# Patient Record
Sex: MALE | Race: WHITE | URBAN - METROPOLITAN AREA
[De-identification: names, ages, dates, MRNs, and addresses within clinical notes are randomized per-mention and may not be internally consistent; named-entity substitution may affect disease eponyms.]

---

## 2017-01-19 ENCOUNTER — OFFICE VISIT (OUTPATIENT)
Dept: SLEEP MEDICINE | Facility: CLINIC | Age: 37
End: 2017-01-19
Payer: COMMERCIAL

## 2017-01-19 VITALS
WEIGHT: 252 LBS | DIASTOLIC BLOOD PRESSURE: 80 MMHG | SYSTOLIC BLOOD PRESSURE: 129 MMHG | HEART RATE: 79 BPM | HEIGHT: 72 IN | BODY MASS INDEX: 34.13 KG/M2

## 2017-01-19 DIAGNOSIS — E66.9 OBESITY (BMI 30-39.9): ICD-10-CM

## 2017-01-19 DIAGNOSIS — G47.9 SLEEP DISTURBANCE: Primary | ICD-10-CM

## 2017-01-19 PROCEDURE — 99204 OFFICE O/P NEW MOD 45 MIN: CPT | Performed by: INTERNAL MEDICINE

## 2017-01-19 NOTE — PROGRESS NOTES
[facility]  Outpatient Sleep Medicine Consultation  January 19, 2017      Name: Robin Salcido MRN# [id number]   Age: 36 year old YOB: 1980     Date of Consultation: January 19, 2017  Consultation is requested by: Alesia Shane MD  Primary care provider: [facility] [place]  [place] [facility]: [facility] [place]         Reason for Sleep Consult:     Robin Salcido is a 36 year old male nightly witnessed apnea, snoring, gasping, poor quality of sleep and frequent awakening, excessive daytime sleepiness and tiredness.         Assessment and Plan:     Summary Sleep Diagnoses/Recommendations:    1. Sleep Disturbance/Hypersomnia:  High suspicion of sleep disordered breathing based on patient's symptoms (snoring, excessive daytime sleepiness, witnessed apneas), high BMI, neck circumference and oropharyngeal examination). Will schedule Home sleep study. We also discussed the pathophysiology of sleep disordered breathing and the importance of treating it if S/he should have it. Follow up after sleep study to discuss the result of sleep study and treatment options. He has maintenance insomnia related to undiagnosed sleep disordered breathing.    2. Obesity:  Counseled regarding weight loss through diet modification and increased physical activity. Patient was given instuctions of weight loss and advised to follow up her PCP for further weight loss interventions.      Orders Placed This Encounter   Procedures     HST-HOME SLEEP TEST/TYPE 3 GIL       Summary Counseling:  See instructions    Counseling included a comprehensive review of diagnostic and therapeutic strategies as well as risks of inadequate therapy.  Educational materials provided in instructions.    All questions were answered.  The patient indicates understanding of the above issues and agrees with the plan set forth.           History of Present Illness:     Robin Salcido is a 36 year old male with history of ADHD,  depression, asthma, psoriasis and allergic rhinitis who presents to the [facility] in [place] with complains of sleep disturbance and evaluation of sleep apnea. He was referred by Dr. Alesia Shane. Patient has loud snoring, wakes up gasping air and stops breathing as per family. Frequent awakening and unable to stay asleep at night. He feels tired and sleepy during the day. He has occasional sleep paralysis, heartburn and morning headache. He grinds his teeth. Not using mouth guard. He has depression and anxiety controlled with medications.  He has hx of ADHD and takes Methylphenidate long acting 40 mg daily.     Please see below for sleep ROS details.    PREVIOUS IN- LAB or HOME SLEEP STUDIES:   None     SLEEP-WAKE SCHEDULE:     Robin Salcido     -Describes themself as a night person;      -ON WEEKDAYS, goes to sleep at 10:30 PM during the week; awakens  7:00 AM with an alarm; falls asleep in 15-30 minutes; denies difficulty falling asleep.     -ON WEEKENDS, goes to sleep at 11:00 PM and wakes up at 8:00 AM without an alarm; falls asleep in 15 minutes.       -Awakens 3-4 times a night for 30 minutes before falling back to sleep; awakens to go to the bathroom and rollover and choking.      -Total sleep time: 7 hours per night.    -Naps 0 times per week.       BEDTIME ACTIVITIES AND SHIFT WORK:    Robin Salcido    -does watch TV in bed and does not use electronics in bed and read in bed.     -does not do shift work.  He works day shifts.       SCALES       SLEEP APNEA: Stopbang score: 6       INSOMNIA:  Insomnia severity score: 16       SLEEPINESS: [place] sleepiness scale (ESS):  11   Drowsy driving/near accidents: No          PHQ9: 13    SLEEP COMPLAINTS:   Snoring- 7 days/week  Witness apnea: Yes  Gasping/Choking: Yes  Excessive daytime sleep: Yes  Toss/turn: Yes  Excessive tiredness/fatigue:  Yes  Morning headaches: No/Yes  Dry mouth/throat: Yes  Dyspnea: No  Coexisting Lung disease:  No    Coexisting Heart disease: No    Does patient have a bed partner: Yes  Has bed partner been sleeping separately because of snoring:  No            RLS Screen: When you try to relax in the evening or sleep at  night, do you ever have unpleasant, restless feelings in your  legs that can be relieved by walking or movement? Yes    Periodic limb movement: Yes    Narcolepsy:       denies sudden urges of sleep attacks     denies cataplexy     occasional sleep paralysis      denies hallucinations     Sleep Behaviors:     denies leg symptoms/movements     denies motor restlessness     denies night terrors     Yes, bruxism     denies automatic behaviors    Other subjective complaints:     denies anxiety or rumination      denies pain and discomfort at  night     denies waking up with heart pounding or racing     Yes, GERD/heartburn         Parasomnia:   NREM - denies recurrent persistent confusional arousal, night eating, sleep walking or sleep terrors   REM  - denies dream enactment; injuries     Safety: None             Medications:     Current Outpatient Prescriptions   Medication Sig     METHYLPHENIDATE HCL PO Take 40 mg by mouth CR     Sertraline HCl (ZOLOFT PO) Take by mouth daily     BuPROPion HCl (WELLBUTRIN PO) Take 300 mg by mouth     Cetirizine HCl (ZYRTEC PO)      VITAMIN D, CHOLECALCIFEROL, PO Take by mouth daily     Naproxen Sodium (ALEVE PO)      HYDROcodone-acetaminophen (NORCO) 5-325 MG per tablet Take 1-2 tablets by mouth every 4 hours as needed for pain     tamsulosin (FLOMAX) 0.4 MG 24 hr capsule Take 1 capsule (0.4 mg) by mouth daily     No current facility-administered medications for this visit.        Medication that can affect sleep: Methylphenidate CD 40 mg daily, Zoloft 100 mg daily and Wellbutrin  mg daily     Allergies   Allergen Reactions     Percocet [Oxycodone-Acetaminophen] Anxiety            Past Medical History:     Does not need 02 supplement at night     Past Medical History    Diagnosis Date     Depressive disorder      ADHD (attention deficit hyperactivity disorder)      Anxiety                Past Surgical History:    Yes previous upper airway surgery: T&A      Past Surgical History   Procedure Laterality Date     Tonsillectomy & adenoidectomy              Social History:     Social History   Substance Use Topics     Smoking status: Current Every Day Smoker     Types: Dip, chew, snus or snuff     Smokeless tobacco: Not on file     Alcohol Use: Yes         Chemical History:     Tobacco: Yes, chew tobacco     Uses 3-4 sodas/day. Last caffeine intake is usually before 2 pm    Supplements for wakefulness: No    EtOH: Yes   Recreational Drugs: No    Psych Hx:    ADHD diagnosed 4/2004, depression and anxiety    Current dangers to self or others: None           Family History:     No family history on file.     Sleep Family Hx:        RLS- No  MAGALIS - No  Insomnia - No  Parasomnia - No         Review of Systems:     A complete 10 point review of systems was negative other than HPI or as commented below:   Patient denies chest pain, wheezing, abdominal pain, n&v, fever, chills, dysuria, leg pain or swelling. Patient has sore throat, postnasal drip, running nose, dry cough and dyspnea with activity.       Robin Salcido has gained 0-5 pounds in the last year.            Physical Examination:   /82 mmHg  Pulse 79  Ht 1.829 m (6')  Wt 114.306 kg (252 lb)  BMI 34.17 kg/m2     Neck Circumference: 50 cm   Constitutional: . Awake, alert, cooperative, in no apparent distress  Mood: euthymic; affect congruent with full range and intensity.  Attention/Concentration:  Normal   Eyes: Pupils round and reactive. No icterus.  ENT: Mallampati Class: IV.   Tonsillar Stage: 0  surgically removed  Clear nasal passages. Enlarged inferior turbinates. No deviated septum.  Oropharynx: No high arched palate. No pharyngeal erythema or exudates, elongated uvula. No lateral narrowing  Tongue: No macroglossia    Dentition: Good.  Dentures: None  Neck: Supple, no thyroid enlargement.   Cardiovascular: Regular S1 and S2, no gallops or murmurs.   Pulmonary:  Chest symmetric, lungs clear bilaterally and no crackles, wheezes or rales.  Abdomen: Soft, obese, non tender.  Extremities:  No pedal edema.  Muscle/joint: Strength and tone normal   Skin:  No rash or significant lesions.   Neurologic: Alert, oriented x3, no focal neurological deficit.           Data: All pertinent previous laboratory data reviewed     No results found for: PH, PHARTERIAL, PO2, [id number], SAT, PCO2, HCO3, BASEEXCESS, KIMBERLY, BEB  No results found for: TSH  Lab Results   Component Value Date    * 08/23/2015     Lab Results   Component Value Date    HGB 14.9 08/23/2015     Lab Results   Component Value Date    BUN 17 08/23/2015    CR 1.16 08/23/2015     Lab Results   Component Value Date    CO2 22 08/23/2015     No results found for: ESTHER      Echocardiography: No    Chest x-ray: No    PFT: No        Copy to: Alesia Shane MD- [facility], [place] Abdullahi I. Ahmed, MD 1/19/2017   [facility]  [address] [place], [place] [address] [phone] Clinic    Total time spent with patient: 45 minutes with this patient today in which 25 minutes was spent in counseling/coordination of care and going over planned testing and recommendations.

## 2017-01-19 NOTE — MR AVS SNAPSHOT
"              After Visit Summary   1/19/2017    Robin Salcido    MRN: [id number]           Patient Information     Date Of Birth          1980        Visit Information        Provider Department      1/19/2017 5:00 PM Larry Arrington MD [facility] [place]        Today's Diagnoses     Sleep disturbance    -  1     Obesity (BMI 30-39.9)           Care Instructions    MY TREATMENT INFORMATION FOR SLEEP DISTURBANCE-  Robin Salcido    DOCTOR : Larry Arrington  SLEEP CENTER :  [place]  MY CONTACT NUMBER:[phone]        If I haven't had a sleep study yet, what can I expect?  A personal story from [facility]  https://www.[employer].com/watch?v=AxPLmlRpnCs    Am I having a home sleep study?  Here is a video in case you get home and want to make sure you have done it correctly  https://www.[employer].com/watch?v=IXN8O3vBzc7&feature=youtu.be    Suspected sleep apnea: Sleep study ordered.    Follow up in sleep clinic 1-2 weeks after sleep study to discuss results of sleep study and treatment options.    Frequently asked questions:  1. What is Obstructive Sleep Apnea (MAGALIS)? MAGALIS is the most common type of sleep apnea. Apnea literally means, \"without breath.\" It is characterized by repetitive pauses in breathing, despite continued effort to breathe, and is usually associated with a reduction in blood oxygen saturation. Apneas can last 10 to over 60 seconds. It is caused by narrowing or collapse of the upper airway as muscles relax during sleep. Severity of sleep apnea is determined by frequency of breathing events and their effect on your sleep and oxygen levels determined during sleep testing.   2. What are the consequences of MAGALIS? Symptoms include: daytime sleepiness- possibly increasing the risk of falling asleep while driving, unrefreshing/restless sleep, snoring, insomnia, waking frequently to urinate, waking with heartburn or reflux, reduced concentration and memory, and morning headaches. " Other health consequences may include development of high blood pressure and other cardiovascular disease in persons who are susceptible. Untreated MAGALIS  can contribute to heart disease, stroke and diabetes.   3. What are the treatment options? In most situations, sleep apnea is a lifelong disease that must be managed with daily therapy. Medications are not effective for sleep apnea and surgery is generally not performed until other therapies have been tried. Therapy is usually tailored to the individual patient based on many factors including your wishes as well as severity of sleep apnea and severity of obesity. Continuous Positive Airway (CPAP) is the most reliable treatment. An oral device to hold your jaw forward is usually the next most reliable option. Other options include postioning devices (to keep you off your back), weight loss, and surgery including a tongue pacing device. There is more detail about some of these options below.            1. CPAP-  WHAT DOES IT DO AND HOW CAN I LEARN TO WEAR IT?                               BEFORE I START, CAN I WATCH A MOVIE TO GET A PLAN ON HOW TO USE CPAP?  https://www.Meetup.com/watch?y=i5B28rx755W      Continuous positive airway pressure, or CPAP, is the most effective treatment for obstructive sleep apnea. It works by blowing room air, through a mask, to hold your throat open. A decision to use CPAP is a major step forward in the pursuit of a healthier life. The successful use of CPAP will help you breathe easier, sleep better and live healthier. You can choose CPAP equipment from any durable medical equipment provider that meets your needs.  Using CPAP can be a positive experience if you keep these lees points in mind:  1. Commitment  CPAP is not a quick fix for your problem. It involves a long-term commitment to improve your sleep and your health.    2. Communication  Stay in close communication with both your sleep doctor and your CPAP supplier. Ask lots of  "questions and seek help when you need it.    3. Consistency  Use CPAP all night, every night and for every nap. You will receive the maximum health benefits from CPAP when you use it every time that you sleep. This will also make it easier for your body to adjust to the treatment.    4. Correction  The first machine and mask that you try may not be the best ones for you. Work with your sleep doctor and your CPAP supplier to make corrections to your equipment selection. Ask about trying a different type of machine or mask if you have ongoing problems. Make sure that your mask is a good fit and learn to use your equipment properly.    5. Challenge  Tell a family member or close friend to ask you each morning if you used your CPAP the previous night. Have someone to challenge you to give it your best effort.    6. Connection   Your adjustment to CPAP will be easier if you are able to connect with others who use the same treatment. Ask your sleep doctor if there is a support group in your area for people who have sleep apnea, or look for one on the Internet.  7. Comfort   Increase your level of comfort by using a saline spray, decongestant or heated humidifier if CPAP irritates your nose, mouth or throat. Use your unit's \"ramp\" setting to slowly get used to the air pressure level. There may be soft pads you can buy that will fit over your mask straps. Look on www.CPAP.com for accessories that can help make CPAP use more comfortable.  8. Cleaning   Clean your mask, tubing and headgear on a regular basis. Put this time in your schedule so that you don't forget to do it. Check and replace the filters for your CPAP unit and humidifier.    9. Completion   Although you are never finished with CPAP therapy, you should reward yourself by celebrating the completion of your first month of treatment. Expect this first month to be your hardest period of adjustment. It will involve some trial and error as you find the machine, mask " and pressure settings that are right for you.    10. Continuation  After your first month of treatment, continue to make a daily commitment to use your CPAP all night, every night and for every nap.    CPAP-Tips to starting with success:  Begin using your CPAP for short periods of time during the day while you watch TV or read.    Use CPAP every night and for every nap. Using it less often reduces the health benefits and makes it harder for your body to get used to it.    Make small adjustments to your mask, tubing, straps and headgear until you get the right fit. Tightening the mask may actually worsen the leak.  If it leaves significant marks on your face or irritates the bridge of your nose, it may not be the best mask for you.  Speak with the person who supplied the mask and consider trying other masks. Insurances will allow you to try different masks during the first month of starting CPAP.  Insurance also covers a new mask, hose and filter about every 6 months.    Use a saline nasal spray to ease mild nasal congestion. Neti-Pot or saline nasal rinses may also help. Nasal gel sprays can help reduce nasal dryness.  Biotene mouthwash can be helpful to protect your teeth if you experience frequent dry mouth.  Dry mouth may be a sign of air escaping out of your mouth or out of the mask in the case of a full face mask.  Speak with your provider if you expect that is the case.     Take a nasal decongestant to relieve more severe nasal or sinus congestion.  Do not use Afrin (oxymetazoline) nasal spray more than 3 days in a row.  Speak with your sleep doctor if your nasal congestion is chronic.    Use a heated humidifier that fits your CPAP model to enhance your breathing comfort. Adjust the heat setting up if you get a dry nose or throat, down if you get condensation in the hose or mask.  Position the CPAP lower than you so that any condensation in the hose drains back into the machine rather than towards the  mask.    Try a system that uses nasal pillows if traditional masks give you problems.    Clean your mask, tubing and headgear once a week. Make sure the equipment dries fully.    Regularly check and replace the filters for your CPAP unit and humidifier.    Work closely with your sleep provider and your CPAP supplier to make sure that you have the machine, mask and air pressure setting that works best for you. It is better to stop using it and call your provider to solve problems than to lay awake all night frustrated with the device.    BESIDES CPAP, WHAT OTHER THERAPIES ARE THERE?      Positioning Device  Positioning devices are generally used when sleep apnea is mild and only occurs on your back.This example shows a pillow that straps around the waist. It may be appropriate for those whose sleep study shows milder sleep apnea that occurs primarily when lying flat on one's back. Preliminary studies have shown benefit but effectiveness at home may need to be verified by a home sleep test. These devices are generally not covered by medical insurance.                      Oral Appliance  What is oral appliance therapy?  An oral appliance is a small acrylic device that fits over the upper and lower teeth or tongue (similar to an orthodontic retainer or a mouth guard). This device slightly advances the lower jaw or tongue, which moves the base of the tongue forward, opens the airway, improves breathing and can effectively treat snoring and obstructive sleep apnea sleep apnea. The appliance is fabricated and customized by a qualified dentist with experience in treating snoring and sleep apnea. Oral appliances are usually well tolerated and have relatively high compliance by patients1, 2, 3.  When is an oral appliance indicated?  Oral appliance therapy is recommended as a first-line treatment for patients with primary snoring, mild sleep apnea, and for patients with moderate sleep apnea who prefer appliance therapy to use  of CPAP4, 5. Severity of sleep apnea is determined by sleep testing and is based on the number of respiratory events per hour of sleep.   How successful is oral appliance therapy?  The success rate of oral appliance therapy in patients with mild sleep apnea is 75-80% while in patients with moderate sleep apnea it is 50-70%. The chance of success in patients with severe sleep apnea is 40-50%. The research also shows that oral appliances have a beneficial effect on the cardiovascular health of MAGALIS patients at the same magnitude as CPAP therapy7.  Oral appliances should be a second-line treatment in cases of severe sleep apnea, but if not completely successful then a combination therapy utilizing CPAP plus oral appliance therapy may be effective. Oral appliances tend to be effective in a broad range of patients although studies show that the patients who have the highest success are females, younger patients, those with milder disease, and less severe obesity. 3, 6.   The chances of success are lower in patients who have more severe MAGALIS, are older, and those who are morbidly obese.     Example of an oral appliance   Finding a dentist that practices dental sleep medicine  Specific training is available through the American Academy of Dental Sleep Medicine for dentists interested in working in the field of sleep. To find a dentist who is educated in the field of sleep and the use of oral appliances, near you, visit the Web site of the American Academy of Dental Sleep Medicine; also see   http://www.accpstorage.org/newOrganization/patients/oralAppliances.pdf  To search for a dentist certified in these practices:  Http://aadsm.org/FindADentist.aspx?1  1. Megha et al. Objectively measured vs self-reported compliance during oral appliance therapy for sleep-disordered breathing. Chest 2013; 144(5): 2547-9866.  2. Rupesh et al. Objective measurement of compliance during oral appliance therapy for sleep-disordered  breathing. Thorax 2013; 68(1): 91-96.  3. Dutch et al. Mandibular advancement devices in 620 men and women with MAGALIS and snoring: tolerability and predictors of treatment success. Chest 2004; 125: 0808-8568.  4. Feliberto et al. Oral appliances for snoring and MAGALIS: a review. Sleep 2006; 29: 244-262.  5. Jovany et al. Oral appliance treatment for MAGALIS: an update. J Clin Sleep Med 2014; 10(2): 215-227.  6. Yvette et al. Predictors of OSAH treatment outcome. J Dent Res 2007; 86: 5799-6313.      Weight Loss:    Weight management is a personal decision.  If you are interested in exploring weight loss strategies, the following discussion covers the impact on weight loss on sleep apnea and the approaches that may be successful.    Weight loss decreases severity of sleep apnea in most people with obesity. For those with mild obesity who have developed snoring with weight gain, even 15-30 pound weight loss can improve and occasionally eliminate sleep apnea.  Structured and life-long dietary and health habits are necessary to lose weight and keep healthier weight levels.     Though there may be significant health benefits from weight loss, long-term weight loss is very difficult to achieve- studies show success with dietary management in less than 10% of people. In addition, substantial weight loss may require years of dietary control and may be difficult if patients have severe obesity. In these cases, surgical management may be considered.  Finally, older individuals who have tolerated obesity without health complications may be less likely to benefit from weight loss strategies.    Your BMI is Body mass index is 34.17 kg/(m^2).  Body mass index (BMI) is one way to tell whether you are at a healthy weight, overweight, or obese. It measures your weight in relation to your height.  A BMI of 18.5 to 24.9 is in the healthy range. A person with a BMI of 25 to 29.9 is considered overweight, and someone with a BMI of 30  or greater is considered obese. More than two-thirds of American adults are considered overweight or obese.  Being overweight or obese increases the risk for further weight gain. Excess weight may lead to heart disease and diabetes.  Creating and following plans for healthy eating and physical activity may help you improve your health.  Weight control is part of healthy lifestyle and includes exercise, emotional health, and healthy eating habits. Careful eating habits lifelong are the mainstay of weight control. Though there are significant health benefits from weight loss, long-term weight loss with diet alone may be very difficult to achieve- studies show long-term success with dietary management in less than 10% of people. Attaining a healthy weight may be especially difficult to achieve in those with severe obesity. In some cases, medications, devices and surgical management might be considered.  What can you do?  If you are overweight or obese and are interested in methods for weight loss, you should discuss this with your provider.     Consider reducing daily calorie intake by 500 calories.     Keep a food journal.     Avoiding skipping meals, consider cutting portions instead.    Diet combined with exercise helps maintain muscle while optimizing fat loss. Strength training is particularly important for building and maintaining muscle mass. Exercise helps reduce stress, increase energy, and improves fitness. Increasing exercise without diet control, however, may not burn enough calories to loose weight.       Start walking three days a week 10-20 minutes at a time    Work towards walking thirty minutes five days a week     Eventually, increase the speed of your walking for 1-2 minutes at time    In addition, we recommend that you review healthy lifestyles and methods for weight loss available through the National Institutes of Health patient information  sites:  http://win.niddk.nih.gov/publications/index.htm    And look into health and wellness programs that may be available through your health insurance provider, employer, local community center, or skinny club.    Weight management plan: Patient was referred to their PCP to discuss a diet and exercise plan.    Surgery:    Upper Airway Surgery for MAGALIS  Surgery for MAGALIS is a second-line treatment option in the management of sleep apnea.  Surgery should be considered for patients who are having a difficult time tolerating CPAP.    Surgery for MAGALIS is directed at areas that are responsible for narrowing or complete obstruction of the airway during sleep.  There are a wide range of procedures available to enlarge and/or stabilize the airway to prevent blockage of breathing in the three major areas where it can occur: the palate, tongue, and nasal regions.  Successful surgical treatment depends on the accurate identification of the factors responsible for obstructive sleep apnea in each person.  A personalized approach is required because there is no single treatment that works well for everyone.  Because of anatomic variation, consultation with an examination by a sleep surgeon is a critical first step in determining what surgical options are best for each patient.  In some cases, examination during sedation may be recommended in order to guide the selection of procedures.  Patients will be counseled about risks and benefits as well as the typical recovery course after surgery. Surgery is typically not a cure for a person s MAGALIS.  However, surgery will often significantly improve one s MAGALIS severity (termed  success rate ).  Even in the absence of a cure, surgery will decrease the cardiovascular risk associated with OSA7; improve overall quality of life8 (sleepiness, functionality, sleep quality, etc).          Palate Procedures:  Patients with MAGALIS often have narrowing of their airway in the region of their tonsils and  uvula.  The goals of palate procedures are to widen the airway in this region as well as to help the tissues resist collapse.  Modern palate procedure techniques focus on tissue conservation and soft tissue rearrangement, rather than tissue removal.  Often the uvula is preserved in this procedure. Residual sleep apnea is common in patient after pharyngoplasty with an average reduction in sleep apnea events of 33%2.      Tongue Procedures:  While patients are awake, the muscles that surround the throat are active and keep this region open for breathing. These muscles relax during sleep, allowing the tongue and other structures to collapse and block breathing.  There are several different tongue procedures available.  Selection of a tongue base procedure depends on characteristics seen on physical exam.  Generally, procedures are aimed at removing bulky tissues in this area or preventing the back of the tongue from falling back during sleep.  Success rates for tongue surgery range from 50-62%3.    Hypoglossal Nerve Stimulation:  Hypoglossal nerve stimulation has recently received approval from the [place] Food and Drug Administration for the treatment of obstructive sleep apnea.  This is based on research showing that the system was safe and effective in treating sleep apnea6.  Results showed that the median AHI score decreased 68%, from 29.3 to 9.0. This therapy uses an implant system that senses breathing patterns and delivers mild stimulation to airway muscles, which keeps the airway open during sleep.  The system consists of three fully implanted components: a small generator (similar in size to a pacemaker), a breathing sensor, and a stimulation lead.  Using a small handheld remote, a patient turns the therapy on before bed and off upon awakening.    Candidates for this device must be greater than 22 years of age, have moderate to severe MAGALIS (AHI between 20-65), BMI less than 32, have tried CPAP/oral  appliance without success, and have appropriate upper airway anatomy (determined by a sleep endoscopy performed by Dr. Rivera).    Hypoglossal Nerve Stimulation Pathway:    The sleep surgeon s office will work with the patient through the insurance prior-authorization process (including communications and appeals).    Nasal Procedures:  Nasal obstruction can interfere with nasal breathing during the day and night.  Studies have shown that relief of nasal obstruction can improve the ability of some patients to tolerate positive airway pressure therapy for obstructive sleep apnea1.  Treatment options include medications such as nasal saline, topical corticosteroid and antihistamine sprays, and oral medications such as antihistamines or decongestants. Non-surgical treatments can include external nasal dilators for selected patients. If these are not successful by themselves, surgery can improve the nasal airway either alone or in combination with these other options.      Combination Procedures:  Combination of surgical procedures and other treatments may be recommended, particularly if patients have more than one area of narrowing or persistent positional disease.  The success rate of combination surgery ranges from 66-80%2,3.      1. Keeley CHEN. The Role of the Nose in Snoring and Obstructive Sleep Apnoea: An Update.  Eur Arch Otorhinolaryngol. 2011; 268: 1365-73.  2.  Capjesus SM; Ortega JA; Lon JR; Pallanch JF; Zenaida MB; Alyx SG; Rohit LOPEZ. Surgical modifications of the upper airway for obstructive sleep apnea in adults: a systematic review and meta-analysis. SLEEP 2010;33(10):3303-3912. Kacy SOUSA. Hypopharyngeal surgery in obstructive sleep apnea: an evidence-based medicine review.  Arch Otolaryngol Head Neck Surg. 2006 Feb;132(2):206-13.  3. Hector IVERSONH1, Maye Y, Stevie CLARKE. The efficacy of anatomically based multilevel surgery for obstructive sleep apnea. Otolaryngol Head Neck Surg. 2003  Oct;129(4):327-35.  4. Kacy SOUSA, Goldberg A. Hypopharyngeal Surgery in Obstructive Sleep Apnea: An Evidence-Based Medicine Review. Arch Otolaryngol Head Neck Surg. 2006 Feb;132(2):206-13.  5. Andrea THOMPSON et al. Upper-Airway Stimulation for Obstructive Sleep Apnea.  N Engl J Med. 2014 Jan 9;370(2):139-49.  6. Corazon Y et al. Increased Incidence of Cardiovascular Disease in Middle-aged Men with Obstructive Sleep Apnea. Am J Respir Crit Care Med; 2002 166: 159-165  7. Medina EM et al. Studying Life Effects and Effectiveness of Palatopharyngoplasty (SLEEP) study: Subjective Outcomes of Isolated Uvulopalatopharyngoplasty. Otolaryngol Head Neck Surg. 2011; 144: 623-631.                Follow-ups after your visit        Future tests that were ordered for you today     Open Future Orders        Priority Expected Expires Ordered    HST-HOME SLEEP TEST/TYPE 3 GIL Routine  7/21/2017 1/19/2017            Who to contact     If you have questions or need follow up information about today's clinic visit or your schedule please contact [facility] directly at [phone].  Normal or non-critical lab and imaging results will be communicated to you by OpenSpirithart, letter or phone within 4 business days after the clinic has received the results. If you do not hear from us within 7 days, please contact the clinic through OpenSpirithart or phone. If you have a critical or abnormal lab result, we will notify you by phone as soon as possible.  Submit refill requests through [email or web address] or call your pharmacy and they will forward the refill request to us. Please allow 3 business days for your refill to be completed.          Additional Information About Your Visit        [email or web address] Information     [email or web address] gives you secure access to your electronic health record. If you see a primary care provider, you can also send messages to your care team and make appointments. If you have questions, please call your primary care clinic.  If  you do not have a primary care provider, please call [phone] and they will assist you.        Care EveryWhere ID     This is your Care EveryWhere ID. This could be used by other organizations to access your [facility] medical records  [id number]        Your Vitals Were     Pulse Height BMI (Body Mass Index)             79 1.829 m (6') 34.17 kg/m2          Blood Pressure from Last 3 Encounters:   01/19/17 140/82   08/23/15 143/76    Weight from Last 3 Encounters:   01/19/17 114.306 kg (252 lb)   08/23/15 117.935 kg (260 lb)               Primary Care Provider Fax #    [facility] [phone]       No address on file        Thank you!     Thank you for choosing [facility]  for your care. Our goal is always to provide you with excellent care. Hearing back from our patients is one way we can continue to improve our services. Please take a few minutes to complete the written survey that you may receive in the mail after your visit with us. Thank you!             Your Updated Medication List - Protect others around you: Learn how to safely use, store and throw away your medicines at www.disposemymeds.org.          This list is accurate as of: 1/19/17  5:51 PM.  Always use your most recent med list.                   Brand Name Dispense Instructions for use    ALEVE PO          HYDROcodone-acetaminophen 5-325 MG per tablet    NORCO    20 tablet    Take 1-2 tablets by mouth every 4 hours as needed for pain       METHYLPHENIDATE HCL PO      Take 40 mg by mouth CR       tamsulosin 0.4 MG capsule    FLOMAX    10 capsule    Take 1 capsule (0.4 mg) by mouth daily       VITAMIN D (CHOLECALCIFEROL) PO      Take by mouth daily       * WELLBUTRIN PO      Take 300 mg by mouth       * BUPROPION HCL PO      Take 150 mg by mouth daily       ZOLOFT PO      Take 150 mg by mouth daily       ZYRTEC PO          * Notice:  This list has 2 medication(s) that are the same as other medications prescribed  for you. Read the directions carefully, and ask your doctor or other care provider to review them with you.

## 2017-01-19 NOTE — NURSING NOTE
Chief Complaint   Patient presents with     Consult     Snores per family, Can hear thru closed door.  Gasps, chokes, holds breath. Can get to sleep but can't stay asleep. referred by Dr Shane.       Initial /82 mmHg  Pulse 79  Ht 1.829 m (6')  Wt 114.306 kg (252 lb)  BMI 34.17 kg/m2 Estimated body mass index is 34.17 kg/(m^2) as calculated from the following:    Height as of this encounter: 1.829 m (6').    Weight as of this encounter: 114.306 kg (252 lb).  BP completed using cuff size: regular      Neck 50  19 3/4  Ess 11  Insominia  16  PHQ - 9  13    Nadiya Aguirre LPN

## 2017-01-19 NOTE — PATIENT INSTRUCTIONS
"MY TREATMENT INFORMATION FOR SLEEP DISTURBANCE-  Robin Salcido    DOCTOR : Larry REYNA [facility]  SLEEP CENTER :  [place]  MY CONTACT NUMBER:[phone]        If I haven't had a sleep study yet, what can I expect?  A personal story from Jimmy  https://www.Lovely.com/watch?v=AxPLmlRpnCs    Am I having a home sleep study?  Here is a video in case you get home and want to make sure you have done it correctly  https://www.IPNetVoiceube.com/watch?v=PVX2K3yKxp7&feature=youtu.be    Suspected sleep apnea: Sleep study ordered.    Follow up in sleep clinic 1-2 weeks after sleep study to discuss results of sleep study and treatment options.    Frequently asked questions:  1. What is Obstructive Sleep Apnea (MAGALIS)? MAGALIS is the most common type of sleep apnea. Apnea literally means, \"without breath.\" It is characterized by repetitive pauses in breathing, despite continued effort to breathe, and is usually associated with a reduction in blood oxygen saturation. Apneas can last 10 to over 60 seconds. It is caused by narrowing or collapse of the upper airway as muscles relax during sleep. Severity of sleep apnea is determined by frequency of breathing events and their effect on your sleep and oxygen levels determined during sleep testing.   2. What are the consequences of MAGALIS? Symptoms include: daytime sleepiness- possibly increasing the risk of falling asleep while driving, unrefreshing/restless sleep, snoring, insomnia, waking frequently to urinate, waking with heartburn or reflux, reduced concentration and memory, and morning headaches. Other health consequences may include development of high blood pressure and other cardiovascular disease in persons who are susceptible. Untreated MAGALIS  can contribute to heart disease, stroke and diabetes.   3. What are the treatment options? In most situations, sleep apnea is a lifelong disease that must be managed with daily therapy. Medications are not effective for sleep apnea and surgery is " generally not performed until other therapies have been tried. Therapy is usually tailored to the individual patient based on many factors including your wishes as well as severity of sleep apnea and severity of obesity. Continuous Positive Airway (CPAP) is the most reliable treatment. An oral device to hold your jaw forward is usually the next most reliable option. Other options include postioning devices (to keep you off your back), weight loss, and surgery including a tongue pacing device. There is more detail about some of these options below.            1. CPAP-  WHAT DOES IT DO AND HOW CAN I LEARN TO WEAR IT?                               BEFORE I START, CAN I WATCH A MOVIE TO GET A PLAN ON HOW TO USE CPAP?  https://www.Ablynx.com/watch?v=h7I75xr409P      Continuous positive airway pressure, or CPAP, is the most effective treatment for obstructive sleep apnea. It works by blowing room air, through a mask, to hold your throat open. A decision to use CPAP is a major step forward in the pursuit of a healthier life. The successful use of CPAP will help you breathe easier, sleep better and live healthier. You can choose CPAP equipment from any durable medical equipment provider that meets your needs.  Using CPAP can be a positive experience if you keep these lees points in mind:  1. Commitment  CPAP is not a quick fix for your problem. It involves a long-term commitment to improve your sleep and your health.    2. Communication  Stay in close communication with both your sleep doctor and your CPAP supplier. Ask lots of questions and seek help when you need it.    3. Consistency  Use CPAP all night, every night and for every nap. You will receive the maximum health benefits from CPAP when you use it every time that you sleep. This will also make it easier for your body to adjust to the treatment.    4. Correction  The first machine and mask that you try may not be the best ones for you. Work with your sleep doctor  "and your CPAP supplier to make corrections to your equipment selection. Ask about trying a different type of machine or mask if you have ongoing problems. Make sure that your mask is a good fit and learn to use your equipment properly.    5. Challenge  Tell a family member or close friend to ask you each morning if you used your CPAP the previous night. Have someone to challenge you to give it your best effort.    6. Connection   Your adjustment to CPAP will be easier if you are able to connect with others who use the same treatment. Ask your sleep doctor if there is a support group in your area for people who have sleep apnea, or look for one on the Internet.  7. Comfort   Increase your level of comfort by using a saline spray, decongestant or heated humidifier if CPAP irritates your nose, mouth or throat. Use your unit's \"ramp\" setting to slowly get used to the air pressure level. There may be soft pads you can buy that will fit over your mask straps. Look on www.CPAP.com for accessories that can help make CPAP use more comfortable.  8. Cleaning   Clean your mask, tubing and headgear on a regular basis. Put this time in your schedule so that you don't forget to do it. Check and replace the filters for your CPAP unit and humidifier.    9. Completion   Although you are never finished with CPAP therapy, you should reward yourself by celebrating the completion of your first month of treatment. Expect this first month to be your hardest period of adjustment. It will involve some trial and error as you find the machine, mask and pressure settings that are right for you.    10. Continuation  After your first month of treatment, continue to make a daily commitment to use your CPAP all night, every night and for every nap.    CPAP-Tips to starting with success:  Begin using your CPAP for short periods of time during the day while you watch TV or read.    Use CPAP every night and for every nap. Using it less often reduces " the health benefits and makes it harder for your body to get used to it.    Make small adjustments to your mask, tubing, straps and headgear until you get the right fit. Tightening the mask may actually worsen the leak.  If it leaves significant marks on your face or irritates the bridge of your nose, it may not be the best mask for you.  Speak with the person who supplied the mask and consider trying other masks. Insurances will allow you to try different masks during the first month of starting CPAP.  Insurance also covers a new mask, hose and filter about every 6 months.    Use a saline nasal spray to ease mild nasal congestion. Neti-Pot or saline nasal rinses may also help. Nasal gel sprays can help reduce nasal dryness.  Biotene mouthwash can be helpful to protect your teeth if you experience frequent dry mouth.  Dry mouth may be a sign of air escaping out of your mouth or out of the mask in the case of a full face mask.  Speak with your provider if you expect that is the case.     Take a nasal decongestant to relieve more severe nasal or sinus congestion.  Do not use Afrin (oxymetazoline) nasal spray more than 3 days in a row.  Speak with your sleep doctor if your nasal congestion is chronic.    Use a heated humidifier that fits your CPAP model to enhance your breathing comfort. Adjust the heat setting up if you get a dry nose or throat, down if you get condensation in the hose or mask.  Position the CPAP lower than you so that any condensation in the hose drains back into the machine rather than towards the mask.    Try a system that uses nasal pillows if traditional masks give you problems.    Clean your mask, tubing and headgear once a week. Make sure the equipment dries fully.    Regularly check and replace the filters for your CPAP unit and humidifier.    Work closely with your sleep provider and your CPAP supplier to make sure that you have the machine, mask and air pressure setting that works best for  you. It is better to stop using it and call your provider to solve problems than to lay awake all night frustrated with the device.    BESIDES CPAP, WHAT OTHER THERAPIES ARE THERE?      Positioning Device  Positioning devices are generally used when sleep apnea is mild and only occurs on your back.This example shows a pillow that straps around the waist. It may be appropriate for those whose sleep study shows milder sleep apnea that occurs primarily when lying flat on one's back. Preliminary studies have shown benefit but effectiveness at home may need to be verified by a home sleep test. These devices are generally not covered by medical insurance.                      Oral Appliance  What is oral appliance therapy?  An oral appliance is a small acrylic device that fits over the upper and lower teeth or tongue (similar to an orthodontic retainer or a mouth guard). This device slightly advances the lower jaw or tongue, which moves the base of the tongue forward, opens the airway, improves breathing and can effectively treat snoring and obstructive sleep apnea sleep apnea. The appliance is fabricated and customized by a qualified dentist with experience in treating snoring and sleep apnea. Oral appliances are usually well tolerated and have relatively high compliance by patients1, 2, 3.  When is an oral appliance indicated?  Oral appliance therapy is recommended as a first-line treatment for patients with primary snoring, mild sleep apnea, and for patients with moderate sleep apnea who prefer appliance therapy to use of CPAP4, 5. Severity of sleep apnea is determined by sleep testing and is based on the number of respiratory events per hour of sleep.   How successful is oral appliance therapy?  The success rate of oral appliance therapy in patients with mild sleep apnea is 75-80% while in patients with moderate sleep apnea it is 50-70%. The chance of success in patients with severe sleep apnea is 40-50%. The research  also shows that oral appliances have a beneficial effect on the cardiovascular health of MAGALIS patients at the same magnitude as CPAP therapy7.  Oral appliances should be a second-line treatment in cases of severe sleep apnea, but if not completely successful then a combination therapy utilizing CPAP plus oral appliance therapy may be effective. Oral appliances tend to be effective in a broad range of patients although studies show that the patients who have the highest success are females, younger patients, those with milder disease, and less severe obesity. 3, 6.   The chances of success are lower in patients who have more severe MAGALIS, are older, and those who are morbidly obese.     Example of an oral appliance   Finding a dentist that practices dental sleep medicine  Specific training is available through the American Academy of Dental Sleep Medicine for dentists interested in working in the field of sleep. To find a dentist who is educated in the field of sleep and the use of oral appliances, near you, visit the Web site of the American Academy of Dental Sleep Medicine; also see   http://www.accpstorage.org/newOrganization/patients/oralAppliances.pdf  To search for a dentist certified in these practices:  Http://aadsm.org/FindADentist.aspx?1  1. Megha, et al. Objectively measured vs self-reported compliance during oral appliance therapy for sleep-disordered breathing. Chest 2013; 144(5): 9667-9828.  2. Rupesh et al. Objective measurement of compliance during oral appliance therapy for sleep-disordered breathing. Thorax 2013; 68(1): 91-96.  3. Dutch, et al. Mandibular advancement devices in 620 men and women with MAGALIS and snoring: tolerability and predictors of treatment success. Chest 2004; 125: 7236-3136.  4. Feliberto, et al. Oral appliances for snoring and MAGALIS: a review. Sleep 2006; 29: 244-262.  5. Jovany et al. Oral appliance treatment for MAGALIS: an update. J Clin Sleep Med 2014; 10(2):  215-227.  6. eflipa Crawford al. Predictors of OSAH treatment outcome. J Dent Res 2007; 86: 8337-2006.      Weight Loss:    Weight management is a personal decision.  If you are interested in exploring weight loss strategies, the following discussion covers the impact on weight loss on sleep apnea and the approaches that may be successful.    Weight loss decreases severity of sleep apnea in most people with obesity. For those with mild obesity who have developed snoring with weight gain, even 15-30 pound weight loss can improve and occasionally eliminate sleep apnea.  Structured and life-long dietary and health habits are necessary to lose weight and keep healthier weight levels.     Though there may be significant health benefits from weight loss, long-term weight loss is very difficult to achieve- studies show success with dietary management in less than 10% of people. In addition, substantial weight loss may require years of dietary control and may be difficult if patients have severe obesity. In these cases, surgical management may be considered.  Finally, older individuals who have tolerated obesity without health complications may be less likely to benefit from weight loss strategies.    Your BMI is Body mass index is 34.17 kg/(m^2).  Body mass index (BMI) is one way to tell whether you are at a healthy weight, overweight, or obese. It measures your weight in relation to your height.  A BMI of 18.5 to 24.9 is in the healthy range. A person with a BMI of 25 to 29.9 is considered overweight, and someone with a BMI of 30 or greater is considered obese. More than two-thirds of American adults are considered overweight or obese.  Being overweight or obese increases the risk for further weight gain. Excess weight may lead to heart disease and diabetes.  Creating and following plans for healthy eating and physical activity may help you improve your health.  Weight control is part of healthy lifestyle and includes  exercise, emotional health, and healthy eating habits. Careful eating habits lifelong are the mainstay of weight control. Though there are significant health benefits from weight loss, long-term weight loss with diet alone may be very difficult to achieve- studies show long-term success with dietary management in less than 10% of people. Attaining a healthy weight may be especially difficult to achieve in those with severe obesity. In some cases, medications, devices and surgical management might be considered.  What can you do?  If you are overweight or obese and are interested in methods for weight loss, you should discuss this with your provider.     Consider reducing daily calorie intake by 500 calories.     Keep a food journal.     Avoiding skipping meals, consider cutting portions instead.    Diet combined with exercise helps maintain muscle while optimizing fat loss. Strength training is particularly important for building and maintaining muscle mass. Exercise helps reduce stress, increase energy, and improves fitness. Increasing exercise without diet control, however, may not burn enough calories to loose weight.       Start walking three days a week 10-20 minutes at a time    Work towards walking thirty minutes five days a week     Eventually, increase the speed of your walking for 1-2 minutes at time    In addition, we recommend that you review healthy lifestyles and methods for weight loss available through the National Institutes of Health patient information sites:  http://win.niddk.nih.gov/publications/index.htm    And look into health and wellness programs that may be available through your health insurance provider, employer, local community center, or skinny club.    Weight management plan: Patient was referred to their PCP to discuss a diet and exercise plan.    Surgery:    Upper Airway Surgery for MAGALIS  Surgery for MAGALIS is a second-line treatment option in the management of sleep apnea.  Surgery  should be considered for patients who are having a difficult time tolerating CPAP.    Surgery for MAGALIS is directed at areas that are responsible for narrowing or complete obstruction of the airway during sleep.  There are a wide range of procedures available to enlarge and/or stabilize the airway to prevent blockage of breathing in the three major areas where it can occur: the palate, tongue, and nasal regions.  Successful surgical treatment depends on the accurate identification of the factors responsible for obstructive sleep apnea in each person.  A personalized approach is required because there is no single treatment that works well for everyone.  Because of anatomic variation, consultation with an examination by a sleep surgeon is a critical first step in determining what surgical options are best for each patient.  In some cases, examination during sedation may be recommended in order to guide the selection of procedures.  Patients will be counseled about risks and benefits as well as the typical recovery course after surgery. Surgery is typically not a cure for a person s MAGALIS.  However, surgery will often significantly improve one s MAGALIS severity (termed  success rate ).  Even in the absence of a cure, surgery will decrease the cardiovascular risk associated with OSA7; improve overall quality of life8 (sleepiness, functionality, sleep quality, etc).          Palate Procedures:  Patients with MAGALIS often have narrowing of their airway in the region of their tonsils and uvula.  The goals of palate procedures are to widen the airway in this region as well as to help the tissues resist collapse.  Modern palate procedure techniques focus on tissue conservation and soft tissue rearrangement, rather than tissue removal.  Often the uvula is preserved in this procedure. Residual sleep apnea is common in patient after pharyngoplasty with an average reduction in sleep apnea events of 33%2.      Tongue Procedures:  While  patients are awake, the muscles that surround the throat are active and keep this region open for breathing. These muscles relax during sleep, allowing the tongue and other structures to collapse and block breathing.  There are several different tongue procedures available.  Selection of a tongue base procedure depends on characteristics seen on physical exam.  Generally, procedures are aimed at removing bulky tissues in this area or preventing the back of the tongue from falling back during sleep.  Success rates for tongue surgery range from 50-62%3.    Hypoglossal Nerve Stimulation:  Hypoglossal nerve stimulation has recently received approval from the [place] Food and Drug Administration for the treatment of obstructive sleep apnea.  This is based on research showing that the system was safe and effective in treating sleep apnea6.  Results showed that the median AHI score decreased 68%, from 29.3 to 9.0. This therapy uses an implant system that senses breathing patterns and delivers mild stimulation to airway muscles, which keeps the airway open during sleep.  The system consists of three fully implanted components: a small generator (similar in size to a pacemaker), a breathing sensor, and a stimulation lead.  Using a small handheld remote, a patient turns the therapy on before bed and off upon awakening.    Candidates for this device must be greater than 22 years of age, have moderate to severe MAGALIS (AHI between 20-65), BMI less than 32, have tried CPAP/oral appliance without success, and have appropriate upper airway anatomy (determined by a sleep endoscopy performed by Dr. Rivera).    Hypoglossal Nerve Stimulation Pathway:    The sleep surgeon s office will work with the patient through the insurance prior-authorization process (including communications and appeals).    Nasal Procedures:  Nasal obstruction can interfere with nasal breathing during the day and night.  Studies have shown that relief of nasal  obstruction can improve the ability of some patients to tolerate positive airway pressure therapy for obstructive sleep apnea1.  Treatment options include medications such as nasal saline, topical corticosteroid and antihistamine sprays, and oral medications such as antihistamines or decongestants. Non-surgical treatments can include external nasal dilators for selected patients. If these are not successful by themselves, surgery can improve the nasal airway either alone or in combination with these other options.      Combination Procedures:  Combination of surgical procedures and other treatments may be recommended, particularly if patients have more than one area of narrowing or persistent positional disease.  The success rate of combination surgery ranges from 66-80%2,3.      1. Keeley CHEN. The Role of the Nose in Snoring and Obstructive Sleep Apnoea: An Update.  Eur Arch Otorhinolaryngol. 2011; 268: 1365-73.  2.  Mira SM; Ortega JA; Lon JR; Pallanch JF; Zenaida MB; Alyx SG; Rohit LOPEZ. Surgical modifications of the upper airway for obstructive sleep apnea in adults: a systematic review and meta-analysis. SLEEP 2010;33(10):8618-3831. Kacy SOUSA. Hypopharyngeal surgery in obstructive sleep apnea: an evidence-based medicine review.  Arch Otolaryngol Head Neck Surg. 2006 Feb;132(2):206-13.  3. Hector YH1, Maye Y, Stevie TRICIA. The efficacy of anatomically based multilevel surgery for obstructive sleep apnea. Otolaryngol Head Neck Surg. 2003 Oct;129(4):327-35.  4. Kacy SOUSA, Goldberg A. Hypopharyngeal Surgery in Obstructive Sleep Apnea: An Evidence-Based Medicine Review. Arch Otolaryngol Head Neck Surg. 2006 Feb;132(2):206-13.  5. Andrea PJ et al. Upper-Airway Stimulation for Obstructive Sleep Apnea.  N Engl J Med. 2014 Jan 9;370(2):139-49.  6. Corazon Y et al. Increased Incidence of Cardiovascular Disease in Middle-aged Men with Obstructive Sleep Apnea. Am J Respir Crit Care Med; 2002 166: 159-165  7. Adam GIBBS  et al. Studying Life Effects and Effectiveness of Palatopharyngoplasty (SLEEP) study: Subjective Outcomes of Isolated Uvulopalatopharyngoplasty. Otolaryngol Head Neck Surg. 2011; 144: 623-631.

## 2017-01-25 ENCOUNTER — OFFICE VISIT (OUTPATIENT)
Dept: SLEEP MEDICINE | Facility: CLINIC | Age: 37
End: 2017-01-25
Payer: COMMERCIAL

## 2017-01-25 DIAGNOSIS — G47.9 SLEEP DISTURBANCE: Primary | ICD-10-CM

## 2017-01-25 PROCEDURE — G0399 HOME SLEEP TEST/TYPE 3 PORTA: HCPCS | Performed by: FAMILY MEDICINE

## 2017-01-25 PROCEDURE — 99207 ZZC DROP WITH A PROCEDURE: CPT

## 2017-01-26 ENCOUNTER — DOCUMENTATION ONLY (OUTPATIENT)
Dept: SLEEP MEDICINE | Facility: CLINIC | Age: 37
End: 2017-01-26

## 2017-01-27 NOTE — PROGRESS NOTES
This HST performed using a Noxturnal T3 device which recorded snore, sound, movement activity, body position, nasal pressure, oronasal thermal airflow, pulse, oximetry and both chest and abdominal respiratory effort. HST data was confined to the time patients states they were in bed.   Patient was score using 1B rules. Patient respiratory events showed an AHI 73.8 with loud snoring. Patient SP02 below 89% was 138.0 minutes. Overall signal quality was good.    Pt will follow up with sleep provider to determine appropriate therapy.

## 2017-02-02 NOTE — PROCEDURES
HOME SLEEP STUDY INTERPRETATION      Patient: jonathan diehl  MRN: [id number]  YOB: 1980  Study Date: 1/25/2017  Ordering Provider: Juan A Plummer MD, MPH     Indications for Home Study: jonathan diehl is an 36 year-old male with a history of attention deficit hyperactivity disorder (currently on treatment with methylphenidate), major depressive disorder, anxiety disorder, asthma, psoriasis, and allergic rhinitis who presents with symptoms suggestive of obstructive sleep apnea.    Patient's Characteristics:   Weight: 114.37 kg   Height: 1.829 m   BMI: 34.17 Kg/m2   Age: 36 years old   Reeds Score: 11 / 24    Data: A full night home sleep study was performed recording the standard physiologic parameters including body position, movement, sound, nasal pressure, thermal oral airflow, chest and abdominal movements with respiratory inductance plethysmography, and oxygen saturation by pulse oximetry. Pulse rate was estimated by oximetry recording. This study was considered adequate based on > 4 hours of quality oximetry and respiratory recording.     Recording Information:  Analysis Time:  599.9 minutes  Time in Bed: 472.0 minutes   Estimated sleep efficiency: 95.7%.   Time spent in supine position:  68.9% of total bed time (325.1 minutes)    Oximetry Analysis:   Sleep Associated Hypoxemia: sustained hypoxemia was present. Baseline oxygen saturation was 93.6%. Time with saturation less than or equal to 88% was 115.2 minutes. The lowest oxygen saturation was 72.0%.     Respiratory Analysis  Snoring: Snoring was present and loud throughout the sleep study.  Respiratory events: The home study revealed a presence of 50.3 obstructive apneas and 11.9 mixed events, and 5.3 central apneas. There were 49 hypopneas resulting in a combined apnea / hypopnea index (AHI) of 73.8 events per hour.  The AHI supine was 94.1 events per hour, whereas the AHI non-supine was 29.0 events per hour.    Pattern: Excluding  events noted above, respiratory rate and pattern was normal.    Position: Percent of time spent: supine- 68.9%; Prone- 7.6%; On left side- 6.8%; On right side- 16.7%.    Cardiac Analysis:  Maximum Pulse Rate: 97.0 beats per minute (bpm)  Minimum Pulse Rate: 52.0 beats per minute (bpm)  Time Spent Above 100 bpm: 0.0 minutes  Time Spent Below 40 bpm: 0.0 minutes    Heart Rate: By pulse oximetry, normal rate was noted.     Assessment:    (1) Severe Obstructive Sleep Apnea.   (2) Sleep-Associated Hypoxemia was present.    Recommendations:  1. Consider auto-titration CPAP with minium pressure of  5 cmH2O - 15 cmH2O as prefered treatment modality.  2. If prefered treatment, CPAP, is not a possible option, alternative treatments with oral appliance therapy and/or positional therapy may be considered.  3. If the abovementioned treatment modalities are not possible and pending on clinical correlation, surgical alternative treatments (including nerve stimulation) may be considered.  4. Suggest optimizing sleep hygiene and avoiding sleep deprivation.  5. Weight management.  6. Avoid sedating medications, including narcotics and alcohol, as these may exacerbate sleep apnea and/or underlying respiratory disorders.  7. Avoid driving when drowsy.    Diagnosis Code(s):    (1) Obstructive Sleep Apnea G47.33   (2) Hypoxemia G47.36    It is important to note that portable HST sleep studies may tend to underestimate the true severity of the condition. Also, it is important to highlight that an in-lab titration PSG sleep study or even another in-lab PSG sleep study may be necessary to adequately understand or treat the condition.    Juan A Plummer MD, MPH, 02/02/2017  Diplomate, American Board of [employer] Medicine, Sleep Medicine

## 2017-02-03 ENCOUNTER — OFFICE VISIT (OUTPATIENT)
Dept: SLEEP MEDICINE | Facility: CLINIC | Age: 37
End: 2017-02-03
Payer: COMMERCIAL

## 2017-02-03 VITALS
OXYGEN SATURATION: 95 % | TEMPERATURE: 98.3 F | DIASTOLIC BLOOD PRESSURE: 89 MMHG | BODY MASS INDEX: 34.54 KG/M2 | SYSTOLIC BLOOD PRESSURE: 136 MMHG | HEIGHT: 72 IN | WEIGHT: 255 LBS | HEART RATE: 81 BPM

## 2017-02-03 DIAGNOSIS — G47.33 OBSTRUCTIVE SLEEP APNEA: Primary | ICD-10-CM

## 2017-02-03 PROCEDURE — 99214 OFFICE O/P EST MOD 30 MIN: CPT | Performed by: INTERNAL MEDICINE

## 2017-02-03 NOTE — PROGRESS NOTES
[facility]  Outpatient Sleep Medicine Encounter  February 3, 2017      Name: Robin Salcido MRN# [id number]   Age: 36 year old YOB: 1980     Date of Consultation: February 3, 2017  Consultation is requested by: No referring provider defined for this encounter.  Primary care provider: [facility], [place]      37 y/o male with severe obstructive sleep apnea and hypoxemia without expressed cardiovascular disease whose sleep apnea is persistent in non-supine position. Patient counseled today on potential complications of untreated disease and all treatment options including device therapy . He is interested in starting CPAP with counseling again on other options after assessing his ability to tolerate and adhere to CPAP in 6-8 weeks.         Assessment and Plan:         Assessment:  Severe obstructive sleep apnea with hypoxemia not uniquely supine  Daytimes sleepiness likely secondary to sleep apnea  ADHD on methylphenidate  Allergic rhinitis, seasonal     Plan:  aCPAP 5-15 with sleep therapy management   Counseling again in 6-8 weeks regarding acceptance of CPAP vs alternative therapy  Yearly followup on CPAP  Active treatment of allergic rhinitis during allergy season.           Physical Examination:   /89 mmHg  Pulse 81  Temp(Src) 98.3  F (36.8  C) (Oral)  Ht 1.829 m (6')  Wt 115.667 kg (255 lb)  BMI 34.58 kg/m2  SpO2 95%             Data: All pertinent previous laboratory data reviewed     No results found for: PH, PHARTERIAL, PO2, SI2HRMRBTFW, SAT, PCO2, HCO3, BASEEXCESS, KIMBERLY, BEB  No results found for: TSH  Lab Results   Component Value Date    * 08/23/2015     Lab Results   Component Value Date    HGB 14.9 08/23/2015     Lab Results   Component Value Date    BUN 17 08/23/2015    CR 1.16 08/23/2015           Copy to: [facility], [place] CONRAD IBER, MD 2/3/2017   [facility]  [address], [place] [address] [phone]    Total time spent with patient: 25min >50% counseling

## 2017-02-03 NOTE — NURSING NOTE
Chief Complaint   Patient presents with     Sleep Problem     HST results       Initial /89 mmHg  Pulse 81  Temp(Src) 98.3  F (36.8  C) (Oral)  Ht 1.829 m (6')  Wt 115.667 kg (255 lb)  BMI 34.58 kg/m2  SpO2 95% Estimated body mass index is 34.58 kg/(m^2) as calculated from the following:    Height as of this encounter: 1.829 m (6').    Weight as of this encounter: 115.667 kg (255 lb).  BP completed using cuff size: regular right arm  Nadiya Ruelas MA  [facility]

## 2017-02-07 ENCOUNTER — DOCUMENTATION ONLY (OUTPATIENT)
Dept: SLEEP MEDICINE | Facility: CLINIC | Age: 37
End: 2017-02-07
Payer: COMMERCIAL

## 2017-02-07 ENCOUNTER — DOCUMENTATION ONLY (OUTPATIENT)
Dept: SLEEP MEDICINE | Facility: CLINIC | Age: 37
End: 2017-02-07

## 2017-02-07 PROCEDURE — 99207 ZZC NO BILLABLE SERVICE THIS VISIT: CPT | Performed by: INTERNAL MEDICINE

## 2017-02-07 NOTE — PROGRESS NOTES
PATIENT CONTACTED [facility] ON 02/06/2017 AND SCHEDULED APPOINTMENT FOR PAP SETUP IN [facility] FOR 02/07/2017.

## 2017-02-08 ENCOUNTER — DOCUMENTATION ONLY (OUTPATIENT)
Dept: SLEEP MEDICINE | Facility: CLINIC | Age: 37
End: 2017-02-08

## 2017-02-08 NOTE — PROGRESS NOTES
Robin Salcido was set up with PAP equipment by [facility] and is enrolled in [facility].  See previous documentation by [facility] for equipment and supply details.

## 2017-02-13 ENCOUNTER — DOCUMENTATION ONLY (OUTPATIENT)
Dept: SLEEP MEDICINE | Facility: CLINIC | Age: 37
End: 2017-02-13

## 2017-02-13 NOTE — PROGRESS NOTES
3 DAY STM VISIT    Patient contacted for 3 day STM visit  Message left for patient to return call    Current settings:  EPAP Min Auto CPAP: 5.0 (The minimum allowable pressure in cmH2O)       EPAP Max Auto CPAP: 15.0 (The maximum allowable pressure in cmH2O)       Assessment:  Over four hours on nights when he uses.  Few nights with no usage.   Action plan: Pt to have f/u 14 day  STM visit.

## 2017-02-23 ENCOUNTER — DOCUMENTATION ONLY (OUTPATIENT)
Dept: SLEEP MEDICINE | Facility: CLINIC | Age: 37
End: 2017-02-23

## 2017-02-23 NOTE — PROGRESS NOTES
Patient returned call.    Subjective measures: Score 1 He is feeling benefit from using however he is struggling with mask fit and comfort.  He would like to change to a nasal mask from a full face.  Discussed elevated AHI and will wait until after mask exchange to look at pressure requirement  at 30 day visit.  He will call back with additional questions or concerns.  He currently does not have a follow up visit scheduled at this time.     His call was routed to [facility].

## 2017-02-23 NOTE — PROGRESS NOTES
14 DAY [facility] VISIT    Message left for patient to return call    Assessment: Pt not meeting objective benchmarks for AHI, primarily obstructive events.  He is hitting top pressure and continuing to have events.    Action plan: Waiting for patient to return call.  Pt may require pressure change due to elevated AHI.    Device settings:    EPAP Min Auto CPAP: 5.0 (The minimum allowable pressure in cmH2O)    EPAP Max Auto CPAP: 15.0 (The maximum allowable pressure in cmH2O)    Target  (95% of Target) 14 day average (Resmed): 14.9cm H20      Objective measures: 14 day rolling measure      Compliance   (Goal >70%)  --% compliance greater than four hours rolling average 14 days: 78.5 %      Leak  (Goal < 24 lpm)  --95% OF Leak in litres Rolling Average 14 Days: 8.86 lpm last data upload         AHI  (Goal < 5)  --AHI Rolling Average 14 Day: 16.48  last data upload         Usage  (Goal >240)  --Time mask on face 14 day average: 407 min

## 2017-03-13 ENCOUNTER — DOCUMENTATION ONLY (OUTPATIENT)
Dept: SLEEP MEDICINE | Facility: CLINIC | Age: 37
End: 2017-03-13

## 2017-03-13 NOTE — PROGRESS NOTES
30 DAY [facility] VISIT    Subjective measures:   Patient switched to nasal mask and has not been doing well. Patient would like to go back to Full face mask.     Assessment: Pt not meeting objective benchmarks for compliance. Patient will come in on 3/14/2017 to demo Full face masks.   Action plan: Pt to have 6 month [facility] visit and scheduled mask fit.    Device settings:    EPAP Min Auto CPAP: 5.0 (The minimum allowable pressure in cmH2O)    EPAP Max Auto CPAP: 15.0 (The maximum allowable pressure in cmH2O)    Target EPAP (95% of Target) 14 day average (Resmed): 13.4cm H20    Objective measures: 14 day rolling measures      % compliance greater than four hours rolling average 14 days: 50 %     95% OF Leak in litres Rolling Average 14 Days: 21.51 lpm  last  upload     AHI Rolling Average 14 Day: 4.12 last  upload     Time mask on face 14 day average: 240 min        Objective measure goal  Compliance   Goal >70%  Leak   Goal < 10%  AHI  Goal < 5  Usage  Goal >240

## 2017-03-14 ENCOUNTER — TELEPHONE (OUTPATIENT)
Dept: SLEEP MEDICINE | Facility: CLINIC | Age: 37
End: 2017-03-14

## 2017-03-14 NOTE — TELEPHONE ENCOUNTER
LVM requesting call back to reschedule a mask refit appt he had with our Virtual care coordinator

## 2017-03-15 ENCOUNTER — DOCUMENTATION ONLY (OUTPATIENT)
Dept: SLEEP MEDICINE | Facility: CLINIC | Age: 37
End: 2017-03-15

## 2017-03-15 NOTE — PROGRESS NOTES
SUBJECTIVE:  Pt requesting to return to full face mask after switching to a nasal mask.  He struggled to breathe with the nasal mask.  He has already changed masks within 30 days    OBJECTIVE:  Change back to full face     ASSESSMENT/PLAN:  Pt given full face [employer] medium mask.  Two week [facility] follow up to be scheduled.

## 2017-03-31 ENCOUNTER — DOCUMENTATION ONLY (OUTPATIENT)
Dept: SLEEP MEDICINE | Facility: CLINIC | Age: 37
End: 2017-03-31

## 2017-03-31 NOTE — PROGRESS NOTES
STM recheck    Message left for patient to return call    Assessment: Pt not meeting objective benchmarks for AHI, patient hitting top of pressure settings may need pressure increase.     Action plan: Waiting for patient to return call.    Device settings:    EPAP Min Auto CPAP: 5.0 (The minimum allowable pressure in cmH2O)    EPAP Max Auto CPAP/: 15.0 (The maximum allowable pressure in cmH2O)    Target (95% of Target) 14 day average (Resmed): 14.9cm H20      Objective measures: 14 day rolling measure     % compliance greater than four hours rolling average 14 days: 100 %     95% OF Leak in litres Rolling Average 14 Days: 17.84 lpm last data upload        AHI Rolling Average 14 Day: 8.93  last data upload        Time mask on face 14 day average: 509 min         Objective measure goal  Compliance   Goal >70%  Leak   Goal < 10%  AHI  Goal < 5  Usage  Goal >240

## 2017-03-31 NOTE — PROGRESS NOTES
Patient returned call    Subjective measures:  Score 1 Pt feeling better after receiving new mask.   He dos not want any pressure changes at this time.  He is having some sick irritation.    Action times.

## 2018-02-06 ENCOUNTER — DOCUMENTATION ONLY (OUTPATIENT)
Dept: SLEEP MEDICINE | Facility: CLINIC | Age: 38
End: 2018-02-06

## 2018-02-06 NOTE — PROGRESS NOTES
Pt contacted due to low/no usage being received from PAP therapy device.     Device is still plugged in, he has had a mask replacement.     Voicemail left for pt to return call.

## 2019-09-29 ENCOUNTER — HEALTH MAINTENANCE LETTER (OUTPATIENT)
Age: 39
End: 2019-09-29

## 2021-01-14 ENCOUNTER — HEALTH MAINTENANCE LETTER (OUTPATIENT)
Age: 41
End: 2021-01-14

## 2021-10-24 ENCOUNTER — HEALTH MAINTENANCE LETTER (OUTPATIENT)
Age: 41
End: 2021-10-24

## 2022-02-13 ENCOUNTER — HEALTH MAINTENANCE LETTER (OUTPATIENT)
Age: 42
End: 2022-02-13

## 2022-10-15 ENCOUNTER — HEALTH MAINTENANCE LETTER (OUTPATIENT)
Age: 42
End: 2022-10-15

## 2023-03-26 ENCOUNTER — HEALTH MAINTENANCE LETTER (OUTPATIENT)
Age: 43
End: 2023-03-26